# Patient Record
Sex: FEMALE | Race: NATIVE HAWAIIAN OR OTHER PACIFIC ISLANDER
[De-identification: names, ages, dates, MRNs, and addresses within clinical notes are randomized per-mention and may not be internally consistent; named-entity substitution may affect disease eponyms.]

---

## 2018-02-03 ENCOUNTER — HOSPITAL ENCOUNTER (EMERGENCY)
Dept: HOSPITAL 5 - ED | Age: 43
Discharge: HOME | End: 2018-02-03
Payer: COMMERCIAL

## 2018-02-03 VITALS — DIASTOLIC BLOOD PRESSURE: 74 MMHG | SYSTOLIC BLOOD PRESSURE: 143 MMHG

## 2018-02-03 DIAGNOSIS — M25.531: ICD-10-CM

## 2018-02-03 DIAGNOSIS — M79.641: Primary | ICD-10-CM

## 2018-02-03 NOTE — XRAY REPORT
FINAL REPORT



EXAM:  XR HAND 2V RT



HISTORY:  Rt hand   wrist pain post fall 



TECHNIQUE:  Three views of the right hand were submitted.



FINDINGS:  

There is no evidence of fracture or soft tissue injury. The wrist

joint appears intact.



IMPRESSION:  

Within normal limits. If there is persistent pain, a follow-up

study is recommended 7-10 days to evaluate for occult fracture.

## 2018-02-03 NOTE — XRAY REPORT
FINAL REPORT



EXAM:  XR FOREARM RT



HISTORY:  Rt forearm to elbow pain post fall 



TECHNIQUE:  Four views of the right forearm were submitted.



FINDINGS:  

On the AP view there is localized cortical thickening in the

distal diaphysis of the ulna. This may be related to a remote

fracture with healing. There is no evidence of acute fracture

involving the radius or ulna. The elbow and wrist joints do not

show any acute changes.



IMPRESSION:  

No definite acute fracture or dislocation.



Localized cortical thickening in the distal diaphysis of the

ulna. This may be related to remote trauma.

## 2018-02-03 NOTE — EMERGENCY DEPARTMENT REPORT
HPI





- General


Chief Complaint: Extremity Injury, Upper


Time Seen by Provider: 02/03/18 07:51





- HPI


HPI: 


42-year-old  female presents to the emergency department after 

she tripped last night and fell with an outstretched right hand.  This occurred 

around 10 PM.  The pain became progressively worse to about an 8 out of 10 in 

the hand, wrist and distal forearm.  She denies any swelling or obvious 

deformity.  She did not take anything for her symptoms prior to presentation.  

She did get some relief when she arrived with some ibuprofen and an ice pack.  

She is right-hand dominant.  She complains of some decreased motion secondary 

to pain.  She denies any past medical history.  She does not have a primary 

care physician.








ED Past Medical Hx





- Past Medical History


Previous Medical History?: No





- Surgical History


Past Surgical History?: No





- Social History


Smoking Status: Never Smoker


Substance Use Type: None





- Medications


Home Medications: 


 Home Medications











 Medication  Instructions  Recorded  Confirmed  Last Taken  Type


 


HYDROcodone/ACETAMINOPHEN [Norco 1 each PO Q8H PRN #8 tablet 02/03/18  Unknown 

Rx





5-325 Tablet]     














ED Review of Systems


ROS: 


Stated complaint: FELL HURT LEFT HAND


Other details as noted in HPI





Comment: All other systems reviewed and negative


Constitutional: denies: chills, fever


Eyes: denies: eye pain, eye discharge, vision change


ENT: denies: ear pain, throat pain


Respiratory: denies: cough, shortness of breath, wheezing


Cardiovascular: denies: chest pain, palpitations


Gastrointestinal: denies: abdominal pain, nausea, diarrhea


Genitourinary: denies: urgency, dysuria, discharge


Musculoskeletal: arthralgia.  denies: back pain


Skin: denies: rash, lesions


Neurological: denies: headache, weakness, paresthesias





Physical Exam





- Physical Exam


Vital Signs: 


 Vital Signs











  02/03/18





  03:33


 


Pulse Rate 73


 


Respiratory 16





Rate 


 


Blood Pressure 115/73


 


O2 Sat by Pulse 100





Oximetry 











Physical Exam: 


GENERAL: The patient is well-developed well-nourished.


HENT: Normocephalic.  Atraumatic.    Patient has moist mucous membranes.


EYES: Extraocular motions are intact.  


NECK: Supple.  Trachea is midline.


CHEST/LUNGS: Clear to auscultation.  There is no respiratory distress noted.


HEART/CARDIOVASCULAR: Regular.  There is no tachycardia.  There is no murmur.


ABDOMEN: Abdomen is soft, nontender.  Patient has normal bowel sounds.  


SKIN: Skin is warm and dry.  No appreciable edema to the affected right hand and

/or wrist.


NEURO: The patient is awake, alert, and oriented.  The patient is cooperative.  

The patient has no focal neurologic deficits.  The patient has normal speech.


MUSCULOSKELETAL: There is some tenderness to palpation to the right hand, wrist 

and distal forearm but no obvious deformity.  Decreased range of motion of the 

hand and wrist secondary to pain.  Radial pulse +2 over 4 and Refill less than 

2 seconds to the affected right upper extremity.  








ED Course


 Vital Signs











  02/03/18





  03:33


 


Pulse Rate 73


 


Respiratory 16





Rate 


 


Blood Pressure 115/73


 


O2 Sat by Pulse 100





Oximetry 














ED Medical Decision Making





- Radiology Data


Radiology results: image reviewed


interpreted by me: 


X-ray of the right hand and forearm do not show any fracture, dislocation or 

any acute process.








- Medical Decision Making


Patient presents after a fall with an outstretched hand.  No obvious fracture 

or dislocation on x-ray.  I discussed with the patient that we cannot see 

ligament or tendons and there is potential that she could have injured one of 

these.  She will use rest, ice, elevation and she will be placed in a splint.  

She'll be given orthopedic referrals.  She will return to the ER with any 

worsening or symptoms or any acute distress.








- Differential Diagnosis


fracture, dislocation, contusion, sprain, strain


Critical Care Time: No


Critical care attestation.: 


If time is entered above; I have spent that time in minutes in the direct care 

of this critically ill patient, excluding procedure time.








ED Disposition


Clinical Impression: 


 Right hand pain, Right wrist pain





Disposition: DC-01 TO HOME OR SELFCARE


Is pt being admited?: No


Condition: Stable


Instructions:  Wrist Injury (ED), Arthralgia (ED)


Additional Instructions: 


Please follow up with an orthopedist next week.  You can use ice, rest, 

elevation.  Return to the emergency Department with any worsening of your 

symptoms or any acute distress. You have been prescribed a medication that is 

sedating and therefore should not be taken prior to driving, working, and 

responsible for children and in no way should be mixed with alcohol of any 

quantity.


Prescriptions: 


HYDROcodone/ACETAMINOPHEN [Norco 5-325 Tablet] 1 each PO Q8H PRN #8 tablet


 PRN Reason: Pain


Referrals: 


PRIMARY CARE,MD [Primary Care Provider] - 3-5 Days


ADRIANO MORGAN MD [Staff Physician] - 3-5 Days


[facility] [Provider Group] - 3-5 Days


Forms:  Work/School Release Form(ED)


Time of Disposition: 07:57